# Patient Record
Sex: FEMALE | Race: WHITE | NOT HISPANIC OR LATINO | URBAN - METROPOLITAN AREA
[De-identification: names, ages, dates, MRNs, and addresses within clinical notes are randomized per-mention and may not be internally consistent; named-entity substitution may affect disease eponyms.]

---

## 2019-11-04 ENCOUNTER — OFFICE VISIT (OUTPATIENT)
Dept: ORTHOPEDICS | Facility: CLINIC | Age: 64
End: 2019-11-04
Payer: COMMERCIAL

## 2019-11-04 VITALS — BODY MASS INDEX: 25.92 KG/M2 | HEIGHT: 62 IN | TEMPERATURE: 98 F | WEIGHT: 140.88 LBS

## 2019-11-04 DIAGNOSIS — M65.4 TENOSYNOVITIS, DE QUERVAIN: Primary | ICD-10-CM

## 2019-11-04 PROCEDURE — 99201 PR OFFICE/OUTPT VISIT,NEW,LEVL I: ICD-10-PCS | Mod: 25,S$GLB,, | Performed by: ORTHOPAEDIC SURGERY

## 2019-11-04 PROCEDURE — 20550 NJX 1 TENDON SHEATH/LIGAMENT: CPT | Mod: RT,S$GLB,, | Performed by: ORTHOPAEDIC SURGERY

## 2019-11-04 PROCEDURE — 3008F PR BODY MASS INDEX (BMI) DOCUMENTED: ICD-10-PCS | Mod: CPTII,S$GLB,, | Performed by: ORTHOPAEDIC SURGERY

## 2019-11-04 PROCEDURE — 20550 TENDON SHEATH: ICD-10-PCS | Mod: RT,S$GLB,, | Performed by: ORTHOPAEDIC SURGERY

## 2019-11-04 PROCEDURE — 99201 PR OFFICE/OUTPT VISIT,NEW,LEVL I: CPT | Mod: 25,S$GLB,, | Performed by: ORTHOPAEDIC SURGERY

## 2019-11-04 PROCEDURE — 3008F BODY MASS INDEX DOCD: CPT | Mod: CPTII,S$GLB,, | Performed by: ORTHOPAEDIC SURGERY

## 2019-11-04 RX ORDER — VILAZODONE HYDROCHLORIDE 20 MG/1
TABLET ORAL
COMMUNITY

## 2019-11-04 RX ORDER — LOSARTAN POTASSIUM 50 MG/1
50 TABLET ORAL DAILY
COMMUNITY

## 2019-11-04 NOTE — PROGRESS NOTES
Subjective:      Patient ID: Rosana Calvo is a 64 y.o. female.    Chief Complaint: Pain of the Right Wrist    HPI 64-year-old lady with a several week history of right radial wrist pain.  She has no history of trauma.  She has noted 2 small masses over the 1st dorsal compartment of the wrist    Review of Systems   Constitution: Negative for fever and weight loss.   Cardiovascular: Negative for chest pain and leg swelling.   Musculoskeletal: Negative for arthritis, joint pain, joint swelling, muscle weakness and stiffness.   Gastrointestinal: Negative for change in bowel habit.   Genitourinary: Negative for bladder incontinence and hematuria.   Neurological: Negative for focal weakness, numbness, paresthesias and sensory change.         Objective:        Examination of the right wrist shows tenderness with palpation of the 1st dorsal compartment of the wrist. Active and passive range of motion of the thumb is normal. She has to tiny what feel like ganglion cyst in the 1st dorsal compartment of the wrist. She has a positive Finkelstein test  Ortho/SPM Exam            Assessment:       Encounter Diagnosis   Name Primary?    Tenosynovitis, de Quervain Yes          Plan:       Rosana was seen today for pain.    Diagnoses and all orders for this visit:    Tenosynovitis, de Quervain     First dorsal compartment is injected today.  Return p.r.n.

## 2020-02-06 ENCOUNTER — OFFICE VISIT (OUTPATIENT)
Dept: ORTHOPEDICS | Facility: CLINIC | Age: 65
End: 2020-02-06
Payer: COMMERCIAL

## 2020-02-06 DIAGNOSIS — M18.11 ARTHRITIS OF CARPOMETACARPAL (CMC) JOINT OF RIGHT THUMB: Primary | ICD-10-CM

## 2020-02-06 PROCEDURE — 20600 SMALL JOINT ASPIRATION/INJECTION: ICD-10-PCS | Mod: RT,S$GLB,, | Performed by: ORTHOPAEDIC SURGERY

## 2020-02-06 PROCEDURE — 99212 PR OFFICE/OUTPT VISIT, EST, LEVL II, 10-19 MIN: ICD-10-PCS | Mod: 25,S$GLB,, | Performed by: ORTHOPAEDIC SURGERY

## 2020-02-06 PROCEDURE — 99212 OFFICE O/P EST SF 10 MIN: CPT | Mod: 25,S$GLB,, | Performed by: ORTHOPAEDIC SURGERY

## 2020-02-06 PROCEDURE — 20600 DRAIN/INJ JOINT/BURSA W/O US: CPT | Mod: RT,S$GLB,, | Performed by: ORTHOPAEDIC SURGERY

## 2020-02-06 NOTE — PROCEDURES
Small Joint Aspiration/Injection  Date/Time: 2/6/2020 9:15 AM  Performed by: Jacob Concepcion MD  Authorized by: Jacob Concepcion MD     Consent Done?:  Yes (Verbal)  Indications:  Pain  Timeout: Prior to procedure the correct patient, procedure, and site was verified      Location:  Thumb  Thumb joint: Right thumb CMC joint.  Prep: Patient was prepped and draped in usual sterile fashion    Needle size:  27 G  Approach:  Radial  Medications:  10 mg triamcinolone acetonide 10 mg/mL  Patient tolerance:  Patient tolerated the procedure well with no immediate complications

## 2020-02-06 NOTE — PROGRESS NOTES
Subjective:      Patient ID: Rosana Calvo is a 64 y.o. female.    Chief Complaint: Pain of the Right Wrist    HPI patient was previously seen for de Quervain tenosynovitis.  The injection in the 1st dorsal compartment of the wrist has helped.  She comes in today complaining of pain more at the base of her thumb    Review of Systems   Constitution: Negative for fever and weight loss.   Cardiovascular: Negative for chest pain and leg swelling.   Musculoskeletal: Positive for arthritis, joint pain and stiffness. Negative for joint swelling and muscle weakness.   Gastrointestinal: Negative for change in bowel habit.   Genitourinary: Negative for bladder incontinence and hematuria.   Neurological: Negative for focal weakness, numbness, paresthesias and sensory change.         Objective:      Examination of the right thumb shows subluxation of the 1st metacarpal on the trapezium.  She is tender with palpation of the CMC joint. She has a positive grind test.  She has normal sensation and normal pulses. She has normal range of motion of thumb      Ortho/SPM Exam            Assessment:       Encounter Diagnosis   Name Primary?    Arthritis of carpometacarpal (CMC) joint of right thumb Yes          Plan:       Rosana was seen today for pain.    Diagnoses and all orders for this visit:    Arthritis of carpometacarpal (CMC) joint of right thumb      X-rays show severe 1st CMC arthritis with subluxation of the metacarpal on the trapezium.  The 1st CMC joint is injected today.  Return p.r.n.

## 2022-07-18 DIAGNOSIS — Z12.11 SCREENING FOR MALIGNANT NEOPLASM OF COLON: Primary | ICD-10-CM

## 2022-07-25 ENCOUNTER — TELEPHONE (OUTPATIENT)
Dept: GASTROENTEROLOGY | Facility: CLINIC | Age: 67
End: 2022-07-25
Payer: MEDICARE

## 2023-09-13 NOTE — TELEPHONE ENCOUNTER
----- Message from Ibis Lora LPN sent at 7/22/2022  2:56 PM CDT -----    ----- Message -----  From: Gabby Arana  Sent: 7/22/2022   1:31 PM CDT  To: Abdiaz[facility] SAENZ Staff    Rosana Calvo is returning a missed call from [place], please give her another call back at [phone] (home)        Health Maintenance Due   Topic Date Due   • COVID-19 Vaccine (1) Never done   • Shingles Vaccine (1 of 2) Never done   • Influenza Vaccine (1) 09/01/2023   • Traditional Medicare- Medicare Wellness Visit  11/11/2023   • Depression Screening  11/14/2023       Patient is due for topics as listed above but is not proceeding with Immunization(s) COVID-19, Influenza and Shingles, Depression Screening  and MWV (Medicare Wellness Visit) at this time. Defer to PCP.    MRI Brain without contrast

 

History: Syncope, confusion, decreased hearing

 

Technique: Multiplanar, multisequential noncontrast MR imaging was 

performed of the brain.

 

Comparison: None other than CT head exam August 19, 2019

 

Findings: There is focus of restricted diffusion of the anterior left 

cerebellum about 1 cm in size, corresponding T2 and FLAIR hyperintense 

signal. There is also small focus of restricted diffusion of the posterior

medial left cerebellum about 0.6 cm in size, hypointense on ADC map. There

could be a very small focus of restricted diffusion of the right occipital

lobe about 0.4 cm although limited evaluation due to artifact in this 

region. There is a small focus of restricted diffusion of the left 

temporal lobe along the periventricular white matter about 0.5 cm. There 

is a 0.4 cm focus of restricted diffusion of the posterior right centrum 

semiovale. There is probable very small focus of restricted diffusion 

right parietal cortical surface about 0.2 cm otherwise difficult to 

characterize given small size. There is small 0.3 cm focus of somewhat 

faint restricted diffusion of the right frontal lobe and also separate 

small focus of the right frontal cortical surface about 0.3 cm in size.

 

There is preservation of the major arterial intracranial flow voids the 

skull base. There is no midline shift or intra-axial mass effect. There 

are old lacunar infarcts of the left corona radiata/centrum semiovale. 

There is other multifocal moderate to severe T2 and FLAIR hyperintense 

signal abnormality of the supratentorial parenchyma bilaterally. There is 

mild to moderate lateral ventriculomegaly and mild third ventriculomegaly 

probably due to atrophy as there is mild-to-moderate generalized atrophy 

present. There is no significant hemosiderin deposition of the brain 

parenchyma. Mastoid air cells are mostly aerated, small focus of fluid on 

the left. There is patchy minimal ethmoid air cell mucosal thickening. 

There has been lens surgery bilaterally. Cerebellar tonsils are normal in 

location. There is preservation of marrow signal of the clivus. There is 

no abnormality of pineal gland or pituitary gland.

 

Impression:

1. There are some scattered small recent acute or subacute infarcts 

bilaterally as stated, largest of the anterior left cerebellum. Given 

bilateral distribution, embolic source should be considered.

2. Other scattered multifocal T2 and FLAIR hyperintense signal abnormality

of the supratentorial parenchyma bilaterally is nonspecific, most commonly

due to chronic microvascular ischemic disease in a patient this age.

3. 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

 

Findings discussed with patient's nurse Elaina at 8/20/2019 2:33 PM.

 

**********FOR INTERNAL CODING PURPOSES**********

 

 

RESULT CODE: (C)  

 

 

 

 

 

Electronically signed by: Demarcus Carrillo MD (8/20/2019 2:37 PM) 

[id number]